# Patient Record
Sex: MALE | HISPANIC OR LATINO | URBAN - METROPOLITAN AREA
[De-identification: names, ages, dates, MRNs, and addresses within clinical notes are randomized per-mention and may not be internally consistent; named-entity substitution may affect disease eponyms.]

---

## 2017-02-17 ENCOUNTER — APPOINTMENT (OUTPATIENT)
Age: 37
Setting detail: DERMATOLOGY
End: 2017-02-20

## 2017-02-17 DIAGNOSIS — L40.0 PSORIASIS VULGARIS: ICD-10-CM

## 2017-02-17 PROCEDURE — 99213 OFFICE O/P EST LOW 20 MIN: CPT

## 2017-02-17 PROCEDURE — OTHER PRESCRIPTION: OTHER

## 2017-02-17 PROCEDURE — OTHER TREATMENT REGIMEN: OTHER

## 2017-02-17 PROCEDURE — OTHER COUNSELING: OTHER

## 2017-02-17 RX ORDER — METHOTREXATE SODIUM 2.5 MG/1
TABLET ORAL
Qty: 40 | Refills: 1 | Status: ERX

## 2017-02-17 RX ORDER — CALCIPOTRIENE 50 UG/G
CREAM TOPICAL
Qty: 60 | Refills: 3 | Status: ERX

## 2017-02-17 RX ORDER — FOLIC ACID 1 MG
TABLET ORAL
Qty: 25 | Refills: 1 | Status: ERX

## 2017-02-17 RX ORDER — FLUOCINONIDE 0.5 MG/ML
CREAM TOPICAL BID
Qty: 30 | Refills: 1 | Status: ERX

## 2017-02-17 ASSESSMENT — LOCATION DETAILED DESCRIPTION DERM
LOCATION DETAILED: LEFT KNEE
LOCATION DETAILED: INFERIOR THORACIC SPINE
LOCATION DETAILED: LEFT ELBOW
LOCATION DETAILED: RIGHT KNEE
LOCATION DETAILED: RIGHT ELBOW

## 2017-02-17 ASSESSMENT — LOCATION SIMPLE DESCRIPTION DERM
LOCATION SIMPLE: RIGHT ELBOW
LOCATION SIMPLE: RIGHT KNEE
LOCATION SIMPLE: UPPER BACK
LOCATION SIMPLE: LEFT KNEE
LOCATION SIMPLE: LEFT ELBOW

## 2017-02-17 ASSESSMENT — LOCATION ZONE DERM
LOCATION ZONE: LEG
LOCATION ZONE: ARM
LOCATION ZONE: TRUNK

## 2017-02-17 NOTE — PROCEDURE: TREATMENT REGIMEN
Start Regimen: Will restart methotrexate 25mg (total 10 pills) per week:  \\nOn Monday take 3 pills in the morning and take 3 pills in the evening,\\nOn Tuesday take 4 pills, \\nOn Wednesday, Thursday, Friday, Saturday, and Sunday mornings, take folic acid 1mg.\\n\\nYou may use Dovonex cream each evening and fluocinolone cream each morning as needed. Initiate Regimen: Will restart methotrexate 25mg (total 10 pills) per week:  \\nOn Monday take 3 pills in the morning and take 3 pills in the evening,\\nOn Tuesday take 4 pills, \\nOn Wednesday, Thursday, Friday, Saturday, and Sunday mornings, take folic acid 1mg.\\n\\nYou may use Dovonex cream each evening and fluocinolone cream each morning as needed.